# Patient Record
Sex: FEMALE | Race: WHITE | NOT HISPANIC OR LATINO | URBAN - METROPOLITAN AREA
[De-identification: names, ages, dates, MRNs, and addresses within clinical notes are randomized per-mention and may not be internally consistent; named-entity substitution may affect disease eponyms.]

---

## 2021-11-01 ENCOUNTER — OUTPATIENT (OUTPATIENT)
Dept: OUTPATIENT SERVICES | Facility: HOSPITAL | Age: 61
LOS: 1 days | Discharge: HOME | End: 2021-11-01

## 2021-11-01 DIAGNOSIS — Z11.59 ENCOUNTER FOR SCREENING FOR OTHER VIRAL DISEASES: ICD-10-CM

## 2021-11-04 ENCOUNTER — OUTPATIENT (OUTPATIENT)
Dept: OUTPATIENT SERVICES | Facility: HOSPITAL | Age: 61
LOS: 1 days | Discharge: HOME | End: 2021-11-04

## 2021-11-04 VITALS
RESPIRATION RATE: 18 BRPM | HEIGHT: 66 IN | HEART RATE: 106 BPM | WEIGHT: 136.91 LBS | SYSTOLIC BLOOD PRESSURE: 142 MMHG | DIASTOLIC BLOOD PRESSURE: 82 MMHG | TEMPERATURE: 97 F

## 2021-11-04 VITALS
HEART RATE: 90 BPM | RESPIRATION RATE: 17 BRPM | DIASTOLIC BLOOD PRESSURE: 80 MMHG | SYSTOLIC BLOOD PRESSURE: 128 MMHG | OXYGEN SATURATION: 100 %

## 2021-11-04 DIAGNOSIS — Z98.890 OTHER SPECIFIED POSTPROCEDURAL STATES: Chronic | ICD-10-CM

## 2021-11-04 RX ORDER — BUPROPION HYDROCHLORIDE 150 MG/1
1 TABLET, EXTENDED RELEASE ORAL
Qty: 0 | Refills: 0 | DISCHARGE

## 2021-11-04 RX ORDER — DENOSUMAB 60 MG/ML
60 INJECTION SUBCUTANEOUS
Qty: 0 | Refills: 0 | DISCHARGE

## 2021-11-04 RX ORDER — TOPIRAMATE 25 MG
1 TABLET ORAL
Qty: 0 | Refills: 0 | DISCHARGE

## 2021-11-04 NOTE — ASU PATIENT PROFILE, ADULT - NSICDXPASTSURGICALHX_GEN_ALL_CORE_FT
PAST SURGICAL HISTORY:  History of surgical procedure rhinoplasty, eyelift, B/L breast augmentation, lumpectomy of right breast w/ radiation, dental implants

## 2021-11-04 NOTE — ASU PATIENT PROFILE, ADULT - NSICDXPASTMEDICALHX_GEN_ALL_CORE_FT
PAST MEDICAL HISTORY:  History of medical problems breast cancer stage 1 right breast w/ radiation, osteoporosis, migaines, uterine cysts

## 2021-11-14 DIAGNOSIS — H26.8 OTHER SPECIFIED CATARACT: ICD-10-CM

## 2024-02-21 PROBLEM — Z87.898 PERSONAL HISTORY OF OTHER SPECIFIED CONDITIONS: Chronic | Status: ACTIVE | Noted: 2021-11-04

## 2024-03-05 ENCOUNTER — NON-APPOINTMENT (OUTPATIENT)
Age: 64
End: 2024-03-05

## 2024-03-06 DIAGNOSIS — Z86.59 PERSONAL HISTORY OF OTHER MENTAL AND BEHAVIORAL DISORDERS: ICD-10-CM

## 2024-03-06 DIAGNOSIS — Z82.62 FAMILY HISTORY OF OSTEOPOROSIS: ICD-10-CM

## 2024-03-06 DIAGNOSIS — M85.80 OTHER SPECIFIED DISORDERS OF BONE DENSITY AND STRUCTURE, UNSPECIFIED SITE: ICD-10-CM

## 2024-03-06 DIAGNOSIS — Z86.39 PERSONAL HISTORY OF OTHER ENDOCRINE, NUTRITIONAL AND METABOLIC DISEASE: ICD-10-CM

## 2024-03-06 DIAGNOSIS — E78.5 HYPERLIPIDEMIA, UNSPECIFIED: ICD-10-CM

## 2024-03-06 DIAGNOSIS — Z80.1 FAMILY HISTORY OF MALIGNANT NEOPLASM OF TRACHEA, BRONCHUS AND LUNG: ICD-10-CM

## 2024-03-06 DIAGNOSIS — U07.1 COVID-19: ICD-10-CM

## 2024-03-06 DIAGNOSIS — N39.0 URINARY TRACT INFECTION, SITE NOT SPECIFIED: ICD-10-CM

## 2024-03-06 DIAGNOSIS — Z92.3 PERSONAL HISTORY OF IRRADIATION: ICD-10-CM

## 2024-03-06 PROBLEM — Z00.00 ENCOUNTER FOR PREVENTIVE HEALTH EXAMINATION: Status: ACTIVE | Noted: 2024-03-06

## 2024-03-06 RX ORDER — TOPIRAMATE 25 MG/1
25 CAPSULE, COATED PELLETS ORAL
Refills: 0 | Status: ACTIVE | COMMUNITY

## 2024-03-13 ENCOUNTER — OUTPATIENT (OUTPATIENT)
Dept: OUTPATIENT SERVICES | Facility: HOSPITAL | Age: 64
LOS: 1 days | End: 2024-03-13
Payer: COMMERCIAL

## 2024-03-13 ENCOUNTER — LABORATORY RESULT (OUTPATIENT)
Age: 64
End: 2024-03-13

## 2024-03-13 ENCOUNTER — APPOINTMENT (OUTPATIENT)
Dept: HEMATOLOGY ONCOLOGY | Facility: CLINIC | Age: 64
End: 2024-03-13
Payer: COMMERCIAL

## 2024-03-13 VITALS
DIASTOLIC BLOOD PRESSURE: 57 MMHG | HEIGHT: 65 IN | OXYGEN SATURATION: 100 % | HEART RATE: 97 BPM | BODY MASS INDEX: 25.66 KG/M2 | WEIGHT: 154 LBS | TEMPERATURE: 97.4 F | SYSTOLIC BLOOD PRESSURE: 133 MMHG | RESPIRATION RATE: 18 BRPM

## 2024-03-13 VITALS
TEMPERATURE: 97 F | HEART RATE: 97 BPM | SYSTOLIC BLOOD PRESSURE: 133 MMHG | RESPIRATION RATE: 18 BRPM | HEIGHT: 65 IN | OXYGEN SATURATION: 100 % | WEIGHT: 154.1 LBS | DIASTOLIC BLOOD PRESSURE: 57 MMHG

## 2024-03-13 DIAGNOSIS — M81.0 AGE-RELATED OSTEOPOROSIS W/OUT CURRENT PATHOLOGICAL FRACTURE: ICD-10-CM

## 2024-03-13 DIAGNOSIS — Z80.7 FAMILY HISTORY OF OTHER MALIGNANT NEOPLASMS OF LYMPHOID, HEMATOPOIETIC AND RELATED TISSUES: ICD-10-CM

## 2024-03-13 DIAGNOSIS — M81.0 AGE-RELATED OSTEOPOROSIS WITHOUT CURRENT PATHOLOGICAL FRACTURE: ICD-10-CM

## 2024-03-13 DIAGNOSIS — Z85.3 PERSONAL HISTORY OF MALIGNANT NEOPLASM OF BREAST: ICD-10-CM

## 2024-03-13 DIAGNOSIS — Z86.79 PERSONAL HISTORY OF OTHER DISEASES OF THE CIRCULATORY SYSTEM: ICD-10-CM

## 2024-03-13 DIAGNOSIS — Z78.9 OTHER SPECIFIED HEALTH STATUS: ICD-10-CM

## 2024-03-13 DIAGNOSIS — Z98.890 OTHER SPECIFIED POSTPROCEDURAL STATES: Chronic | ICD-10-CM

## 2024-03-13 LAB
ALBUMIN SERPL ELPH-MCNC: 3.8 G/DL
ALP BLD-CCNC: 62 U/L
ALT SERPL-CCNC: 21 U/L
ANION GAP SERPL CALC-SCNC: 11 MMOL/L
AST SERPL-CCNC: 28 U/L
BILIRUB SERPL-MCNC: 0.6 MG/DL
BUN SERPL-MCNC: 15 MG/DL
CALCIUM SERPL-MCNC: 10 MG/DL
CHLORIDE SERPL-SCNC: 109 MMOL/L
CO2 SERPL-SCNC: 28 MMOL/L
CREAT SERPL-MCNC: 0.8 MG/DL
EGFR: 84 ML/MIN/1.73M2
GLUCOSE SERPL-MCNC: 90 MG/DL
POTASSIUM SERPL-SCNC: 4.2 MMOL/L
PROT SERPL-MCNC: 7 G/DL
SODIUM SERPL-SCNC: 148 MMOL/L

## 2024-03-13 PROCEDURE — 99214 OFFICE O/P EST MOD 30 MIN: CPT

## 2024-03-13 PROCEDURE — 36415 COLL VENOUS BLD VENIPUNCTURE: CPT

## 2024-03-13 PROCEDURE — G2211 COMPLEX E/M VISIT ADD ON: CPT | Mod: NC,1L

## 2024-03-13 PROCEDURE — 96372 THER/PROPH/DIAG INJ SC/IM: CPT

## 2024-03-13 RX ORDER — DENOSUMAB 60 MG/ML
60 INJECTION SUBCUTANEOUS
Refills: 0 | Status: ACTIVE | COMMUNITY
Start: 2024-03-13

## 2024-03-13 RX ORDER — RIMEGEPANT SULFATE 75 MG/75MG
75 TABLET, ORALLY DISINTEGRATING ORAL
Refills: 0 | Status: ACTIVE | COMMUNITY
Start: 2024-03-13

## 2024-03-13 RX ORDER — BUPROPION HYDROCHLORIDE 150 MG/1
150 TABLET, FILM COATED, EXTENDED RELEASE ORAL DAILY
Refills: 0 | Status: ACTIVE | COMMUNITY
Start: 2024-03-13

## 2024-03-13 RX ORDER — MULTIVIT-MIN/IRON/FOLIC ACID/K 18-600-40
50 MCG CAPSULE ORAL
Refills: 0 | Status: ACTIVE | COMMUNITY
Start: 2024-03-13

## 2024-03-13 RX ORDER — DENOSUMAB 60 MG/ML
60 INJECTION SUBCUTANEOUS ONCE
Refills: 0 | Status: COMPLETED | OUTPATIENT
Start: 2024-03-13 | End: 2024-03-13

## 2024-03-13 RX ORDER — CETIRIZINE HYDROCHLORIDE 10 MG/1
10 TABLET, FILM COATED ORAL DAILY
Refills: 0 | Status: ACTIVE | COMMUNITY
Start: 2024-03-13

## 2024-03-13 RX ORDER — FAMOTIDINE 20 MG/1
20 TABLET, FILM COATED ORAL DAILY
Refills: 0 | Status: ACTIVE | COMMUNITY
Start: 2024-03-13

## 2024-03-13 RX ADMIN — DENOSUMAB 60 MILLIGRAM(S): 60 INJECTION SUBCUTANEOUS at 12:43

## 2024-03-13 NOTE — REVIEW OF SYSTEMS
[Fatigue] : fatigue [Recent Change In Weight] : ~T recent weight change [Diarrhea: Grade 0] : Diarrhea: Grade 0 [Anxiety] : anxiety [Insomnia] : insomnia [Depression] : depression [Negative] : Allergic/Immunologic [Fever] : no fever [Chills] : no chills [Night Sweats] : no night sweats [Confused] : no confusion [Dizziness] : no dizziness [Fainting] : no fainting [Difficulty Walking] : no difficulty walking [Suicidal] : not suicidal [FreeTextEntry2] : wt gain 20lbs [FreeTextEntry7] : Gerd [de-identified] : migraine

## 2024-03-13 NOTE — HISTORY OF PRESENT ILLNESS
[de-identified] :  recently saw cardiol - w/u in progress. Uncomfortable with wt gain Due for prolia up to date dentist. S/P BCC - upper abd Colonoscopy: last 2019 GYN: due in May but GYN is closing practice Last GYN appt -October 2023 [de-identified] : Had undergone mammography annually since age 37.   7/23/08, bilateral mammography reportedly done.  Additional magnification views of the right breast for apparent nodular density seen and bilateral calcifications.  Additional views done felt to be benign changes, and short interval follow up was recommended.The patient was seen by Dr. Salvador for cosmetic surgery and further evaluation of films was recommended. Left diagnostic mammography 5 mm cluster of microcalcifications, mildly pleomorphic, and were not visualized on mammogram dated 4/7/07.  Stereotactic-guided biopsy was recommended and bilateral breast sonography on 7/31/08 revealed 5 mm hypoechoic nodule in the peripheral 5 o'clock axis of the right breast, and ultrasound-guided core biopsy was recommended.  8/1/08, right  5 o'clock core biopsy revealed infiltrating well differentiated duct carcinoma with predominantly tubular features, and mammotome core biopsies of left breast microcalcifications revealed fibrocystic changes. 8/5/08, bilateral breast MRI revealed a 1.2 cm irregular enhancing mass in the 5 o'clock axis right breast with postbiopsy changes, corresponding to the recently diagnosed invasive cancer, and postbiopsy changes in the left breast 3 to 4 o'clock region, as well as benign, tiny foci of parenchymal enhancement bilaterally, most likely benign and related to fibrocystic changes and/or hormonal factors.  The patient is on Day 14 of her menstrual cycle. 8/8/08, right wide excision and sentinel lymph node excision revealed infiltrating, well differentiated duct carcinoma with tubular features, 0.4 cm, focal DCIS, EORTC low grade, solid type, biopsy site changes, no LVI,  additional margins were negative, 1 sentinel lymph node was negative.  ER-positive 60%, [place]-positive 100%, and HER2/loli negative.Ambry panel neg. Could not tolerate anti-ePatient follow-up with enhanced surveillance mammogram sonogram alternating with breast MRI at 6 months intervalBilateral breast MRI 3/13/2023 [facility] stable 1.2 cm circumscribed high signal nodule in right lower liver new similar-appearing nodule in the lateral aspect of the liver, abdominal ultrasound probable hepatic cysts.7/6/2023 bilateral mammogram and ultrasound unremarkablePatient continues on Prolia is due for treatment today.  Patient has been followed by Dr. Escalante at [facility] 6/20/2023 bone density osteoporosis with improvement.  Patient returns today for follow-up she is concerned about weight gain Wellbutrin has been decreased in dose.  Patient is unable does not exercise regularly she notes discomfort due to weight gain and disc and poor tolerance.  The other daughter is a mahi in high school.She presents today for follow-up

## 2024-03-13 NOTE — ASSESSMENT
[FreeTextEntry1] :  T1a 63-year-old female with positive family history of breast cancer s/p Ambry panel and Ambry panel negative s/p right WLE and RT for at age 50 for T1 a N0 could not tolerate anti-E additional problems include osteoporosis currently receiving Prolia will receive dose today she is up to date with dentist also followed by Dr. Escalante- recent bone density with improvement.  Discussed psychosocial issues regarding family life and children.  Patient will have to be scheduled for breast MRI this month and bilateral mammogram and sonogram in July.  Weight gain was discussed in detail patient does not want referral for weight prop for weight gain reviewed diet and exercise patient has is aerobically deconditioned recommend increasing slowly increasing exercise as tolerated.  Of note patient has body dysmorphic syndrome and history of anorexia.  Patient will receive Prolia today CBC chem and D were checked.  She will follow-up in 6 months.

## 2024-07-29 ENCOUNTER — NON-APPOINTMENT (OUTPATIENT)
Age: 64
End: 2024-07-29

## 2024-08-12 ENCOUNTER — NON-APPOINTMENT (OUTPATIENT)
Age: 64
End: 2024-08-12

## 2024-08-12 ENCOUNTER — APPOINTMENT (OUTPATIENT)
Dept: VASCULAR SURGERY | Facility: CLINIC | Age: 64
End: 2024-08-12
Payer: COMMERCIAL

## 2024-08-12 VITALS
BODY MASS INDEX: 24.99 KG/M2 | DIASTOLIC BLOOD PRESSURE: 81 MMHG | SYSTOLIC BLOOD PRESSURE: 139 MMHG | HEIGHT: 65 IN | WEIGHT: 150 LBS

## 2024-08-12 DIAGNOSIS — M79.89 OTHER SPECIFIED SOFT TISSUE DISORDERS: ICD-10-CM

## 2024-08-12 PROCEDURE — 99203 OFFICE O/P NEW LOW 30 MIN: CPT

## 2024-08-12 PROCEDURE — 93971 EXTREMITY STUDY: CPT

## 2024-08-12 RX ORDER — BUPROPION HYDROCHLORIDE 300 MG/1
300 TABLET, EXTENDED RELEASE ORAL
Refills: 0 | Status: ACTIVE | COMMUNITY

## 2024-08-12 NOTE — DATA REVIEWED
[FreeTextEntry1] : I performed a venous duplex which was medically necessary to evaluate for DVT. It showed no evidence of DVT in the left LE.

## 2024-08-12 NOTE — PHYSICAL EXAM
[2+] : left 2+ [Ankle Swelling (On Exam)] : present [Ankle Swelling On The Left] : moderate [Varicose Veins Of Lower Extremities] : not present [] : not present

## 2024-08-12 NOTE — HISTORY OF PRESENT ILLNESS
[FreeTextEntry1] : 62 y/o F with h/o LLE swelling, presents for worsening elft ankle and foot swelling for the past one week, after she did "a lot" of walking in a new pair of shoes.

## 2024-08-12 NOTE — ASSESSMENT
[FreeTextEntry1] : 62 y/o F with left foot and ankle swelling and pain.  No evidence of DVT in the left LE on duplex today, no cellulitis on exam.  Advised to see Podiatry for foot pain, compression stockings prescribed.

## 2024-09-19 ENCOUNTER — OUTPATIENT (OUTPATIENT)
Dept: OUTPATIENT SERVICES | Facility: HOSPITAL | Age: 64
LOS: 1 days | End: 2024-09-19
Payer: COMMERCIAL

## 2024-09-19 ENCOUNTER — APPOINTMENT (OUTPATIENT)
Dept: INFUSION THERAPY | Facility: CLINIC | Age: 64
End: 2024-09-19

## 2024-09-19 ENCOUNTER — APPOINTMENT (OUTPATIENT)
Dept: HEMATOLOGY ONCOLOGY | Facility: CLINIC | Age: 64
End: 2024-09-19
Payer: COMMERCIAL

## 2024-09-19 ENCOUNTER — RX RENEWAL (OUTPATIENT)
Age: 64
End: 2024-09-19

## 2024-09-19 VITALS
RESPIRATION RATE: 18 BRPM | HEIGHT: 65 IN | HEART RATE: 98 BPM | DIASTOLIC BLOOD PRESSURE: 88 MMHG | OXYGEN SATURATION: 99 % | SYSTOLIC BLOOD PRESSURE: 138 MMHG | WEIGHT: 149.91 LBS | TEMPERATURE: 97 F

## 2024-09-19 VITALS
BODY MASS INDEX: 25.08 KG/M2 | TEMPERATURE: 98 F | DIASTOLIC BLOOD PRESSURE: 77 MMHG | SYSTOLIC BLOOD PRESSURE: 123 MMHG | WEIGHT: 150.5 LBS | OXYGEN SATURATION: 98 % | HEART RATE: 100 BPM | RESPIRATION RATE: 18 BRPM | HEIGHT: 65 IN

## 2024-09-19 DIAGNOSIS — Z85.3 PERSONAL HISTORY OF MALIGNANT NEOPLASM OF BREAST: ICD-10-CM

## 2024-09-19 DIAGNOSIS — M81.0 AGE-RELATED OSTEOPOROSIS WITHOUT CURRENT PATHOLOGICAL FRACTURE: ICD-10-CM

## 2024-09-19 DIAGNOSIS — Z86.59 PERSONAL HISTORY OF OTHER MENTAL AND BEHAVIORAL DISORDERS: ICD-10-CM

## 2024-09-19 DIAGNOSIS — N95.1 MENOPAUSAL AND FEMALE CLIMACTERIC STATES: ICD-10-CM

## 2024-09-19 DIAGNOSIS — Z86.79 PERSONAL HISTORY OF OTHER DISEASES OF THE CIRCULATORY SYSTEM: ICD-10-CM

## 2024-09-19 DIAGNOSIS — Z98.890 OTHER SPECIFIED POSTPROCEDURAL STATES: Chronic | ICD-10-CM

## 2024-09-19 DIAGNOSIS — Z86.39 PERSONAL HISTORY OF OTHER ENDOCRINE, NUTRITIONAL AND METABOLIC DISEASE: ICD-10-CM

## 2024-09-19 DIAGNOSIS — M81.0 AGE-RELATED OSTEOPOROSIS W/OUT CURRENT PATHOLOGICAL FRACTURE: ICD-10-CM

## 2024-09-19 LAB
ALBUMIN SERPL ELPH-MCNC: 3.9 G/DL
ALP BLD-CCNC: 61 U/L
ALT SERPL-CCNC: 18 U/L
ANION GAP SERPL CALC-SCNC: 1 MMOL/L
AST SERPL-CCNC: 26 U/L
BILIRUB SERPL-MCNC: 0.6 MG/DL
BUN SERPL-MCNC: 16 MG/DL
CALCIUM SERPL-MCNC: 9.7 MG/DL
CHLORIDE SERPL-SCNC: 111 MMOL/L
CO2 SERPL-SCNC: 28 MMOL/L
CREAT SERPL-MCNC: 0.8 MG/DL
EGFR: 84 ML/MIN/1.73M2
GLUCOSE SERPL-MCNC: 92 MG/DL
HCT VFR BLD CALC: 35.6 %
HGB BLD-MCNC: 12 G/DL
LYMPHOCYTES # BLD AUTO: 1 K/UL
LYMPHOCYTES NFR BLD AUTO: 25.5 %
MAN DIFF?: NO
MCHC RBC-ENTMCNC: 31.3 PG
MCHC RBC-ENTMCNC: 33.7 GM/DL
MCV RBC AUTO: 92.7 FL
NEUTROPHILS # BLD AUTO: 2.6 K/UL
NEUTROPHILS NFR BLD AUTO: 63.4 %
PLATELET # BLD AUTO: 305 K/UL
POTASSIUM SERPL-SCNC: 4.9 MMOL/L
PROT SERPL-MCNC: 6.8 G/DL
RBC # BLD: 3.84 M/UL
RBC # FLD: 13.4 %
SODIUM SERPL-SCNC: 140 MMOL/L
WBC # FLD AUTO: 4 K/UL

## 2024-09-19 PROCEDURE — G2211 COMPLEX E/M VISIT ADD ON: CPT | Mod: NC

## 2024-09-19 PROCEDURE — 96372 THER/PROPH/DIAG INJ SC/IM: CPT

## 2024-09-19 PROCEDURE — 36415 COLL VENOUS BLD VENIPUNCTURE: CPT

## 2024-09-19 PROCEDURE — 99214 OFFICE O/P EST MOD 30 MIN: CPT

## 2024-09-19 RX ORDER — ATORVASTATIN CALCIUM 20 MG/1
20 TABLET, FILM COATED ORAL
Refills: 0 | Status: ACTIVE | COMMUNITY
Start: 2024-09-19

## 2024-09-19 RX ORDER — DENOSUMAB 60 MG/ML
60 INJECTION SUBCUTANEOUS ONCE
Refills: 0 | Status: COMPLETED | OUTPATIENT
Start: 2024-09-19 | End: 2024-09-19

## 2024-09-19 RX ORDER — ESTRADIOL 10 UG/1
10 TABLET, FILM COATED VAGINAL DAILY
Qty: 57 | Refills: 0 | Status: ACTIVE | COMMUNITY
Start: 2024-09-19 | End: 1900-01-01

## 2024-09-19 RX ORDER — LOSARTAN POTASSIUM 25 MG/1
25 TABLET, FILM COATED ORAL
Refills: 0 | Status: ACTIVE | COMMUNITY
Start: 2024-09-19

## 2024-09-19 RX ADMIN — DENOSUMAB 60 MILLIGRAM(S): 60 INJECTION SUBCUTANEOUS at 12:07

## 2024-09-21 LAB — 25(OH)D3 SERPL-MCNC: 38.9 NG/ML

## 2024-09-22 NOTE — REVIEW OF SYSTEMS
[Recent Change In Weight] : ~T recent weight change [Diarrhea: Grade 0] : Diarrhea: Grade 0 [Insomnia] : insomnia [Anxiety] : anxiety [Depression] : depression [FreeTextEntry2] : wt gain 20lbs [Negative] : Psychiatric [Fever] : no fever [Chills] : no chills [Night Sweats] : no night sweats [Fatigue] : no fatigue [Confused] : no confusion [Dizziness] : no dizziness [Fainting] : no fainting [Difficulty Walking] : no difficulty walking [Suicidal] : not suicidal [FreeTextEntry7] : Gerd [de-identified] : migraines intermittently - Botox with relief

## 2024-09-22 NOTE — HISTORY OF PRESENT ILLNESS
[de-identified] : Had undergone mammography annually since age 37.   08, bilateral mammography reportedly done.  Additional magnification views of the right breast for apparent nodular density seen and bilateral calcifications.  Additional views done felt to be benign changes, and short interval follow up was recommended.The patient was seen by Dr. Salvador for cosmetic surgery and further evaluation of films was recommended. Left diagnostic mammography 5 mm cluster of microcalcifications, mildly pleomorphic, and were not visualized on mammogram dated 07.  Stereotactic-guided biopsy was recommended and bilateral breast sonography on 08 revealed 5 mm hypoechoic nodule in the peripheral 5 o'clock axis of the right breast, and ultrasound-guided core biopsy was recommended.  08, right  5 o'clock core biopsy revealed infiltrating well differentiated duct carcinoma with predominantly tubular features, and mammotome core biopsies of left breast microcalcifications revealed fibrocystic changes. 08, bilateral breast MRI revealed a 1.2 cm irregular enhancing mass in the 5 o'clock axis right breast with postbiopsy changes, corresponding to the recently diagnosed invasive cancer, and postbiopsy changes in the left breast 3 to 4 o'clock region, as well as benign, tiny foci of parenchymal enhancement bilaterally, most likely benign and related to fibrocystic changes and/or hormonal factors.  The patient is on Day 14 of her menstrual cycle. 08, right wide excision and sentinel lymph node excision revealed infiltrating, well differentiated duct carcinoma with tubular features, 0.4 cm, focal DCIS, EORTC low grade, solid type, biopsy site changes, no LVI,  additional margins were negative, 1 sentinel lymph node was negative.  ER-positive 60%, [place]-positive 100%, and HER2/loli negative.Ambry panel neg. Could not tolerate anti-ePatient follow-up with enhanced surveillance mammogram sonogram alternating with breast MRI at 6 months intervalBilateral breast MRI 3/13/2023 [facility] stable 1.2 cm circumscribed high signal nodule in right lower liver new similar-appearing nodule in the lateral aspect of the liver, abdominal ultrasound probable hepatic cysts.2023 bilateral mammogram and ultrasound unremarkablePatient continues on Prolia is due for treatment today.  Patient has been followed by Dr. Escalante at [facility] 2023 bone density osteoporosis with improvement.  Patient returns today for follow-up she is concerned about weight gain Wellbutrin has been decreased in dose.  Patient is unable does not exercise regularly she notes discomfort due to weight gain and disc and poor tolerance.  The other daughter is a mahi in high school.She presents today for follow-up  ALLERGIES:  Penicillin, seasonal allergies, and foods.      PAST MEDICAL HISTORY: Menarche age 14, , TOP and one miscarriage.   40 and 45.  No history of breast feeding.  IVF for 4 cycles,  and .  GYN .  History of mitral valve prolapse.  T&A at age 11.  History of IBS.  Elevated cholesterol for 2 years.  Colonoscopy 5 to 6 years ago with benign polyp.  History of migraine with menses.     SOCIAL HISTORY:  She does not smoke or drink.  She is a homemaker.    FAMILY HISTORY:  AMBRY neg - father [place] descent,  of complications of MI and diabetes at age 76.  He had 2 brothers, one of whom  of lung cancer.  Mother is eastern- descent, non-Yarsani,  of lung cancer at age 73.  She had one sister who  of TB and one brother.  Maternal grandmother with ovarian cancer.  Extended family history is not well-known.  The patient has one brother who had stage IV lymphoma at age 55 and she has 2 daughters.      [de-identified] : recently saw cardiol - w/u in progress. Uncomfortable with wt gain Due for prolia up to date dentist. S/P BCC - upper abd Colonoscopy: last 2019 GYN: due in May but GYN is closing practice Last GYN appt -October 2023 9/19 - feeling well overall, recently started medications for high BP and high cholesterol, denies pain. Unable to tolerate B12. Gained wt - up to date dentist, welbutrin increased had bleeding for 2 d post gyn exam, then UTI - saw urologist. Not sexually active. Walking, No ETOH 7/29/24 Bilat MMG/US [facility] Hx BCC Lt iupper abd wall Followed by Dr Escalante re [facility] Colonoscopy 2019

## 2024-09-22 NOTE — ASSESSMENT
[FreeTextEntry1] :  T1a 63-year-old female with positive family history of breast cancer  Ambry panel  negative s/p right WLE and RT for at age 50 for T1 a N0 could not tolerate anti-E additional problems include osteoporosis currently receiving Prolia will receive dose today she is up to date with dentist also followed by Dr. Escalante- recent bone density with improvement.  Discussed psychosocial issues regarding family life and children.  Patient will have to be scheduled for breast MRI this month and bilateral mammogram and sonogram in July.  Weight gain was discussed in detail patient does not want referral for weight management Discussed diet and exercise. Needs to increase aerobic exercise   Of note patient has body dysmorphic syndrome and history of anorexia.  Patient will receive Prolia today CBC chem and D were checked.  She will follow-up in 6 months.

## 2024-09-22 NOTE — PHYSICAL EXAM
[Normal] : affect appropriate [Restricted in physically strenuous activity but ambulatory and able to carry out work of a light or sedentary nature] : Status 1- Restricted in physically strenuous activity but ambulatory and able to carry out work of a light or sedentary nature, e.g., light house work, office work [de-identified] : bilat augmentation s/p Rt wle and RT, s/p exc L upper abd wall

## 2024-09-22 NOTE — REVIEW OF SYSTEMS
[Recent Change In Weight] : ~T recent weight change [Diarrhea: Grade 0] : Diarrhea: Grade 0 [Insomnia] : insomnia [Anxiety] : anxiety [Depression] : depression [FreeTextEntry2] : wt gain 20lbs [Negative] : Psychiatric [Fever] : no fever [Chills] : no chills [Night Sweats] : no night sweats [Fatigue] : no fatigue [Confused] : no confusion [Dizziness] : no dizziness [Fainting] : no fainting [Difficulty Walking] : no difficulty walking [Suicidal] : not suicidal [FreeTextEntry7] : Gerd [de-identified] : migraines intermittently - Botox with relief

## 2024-09-22 NOTE — HISTORY OF PRESENT ILLNESS
[de-identified] : Had undergone mammography annually since age 37.   08, bilateral mammography reportedly done.  Additional magnification views of the right breast for apparent nodular density seen and bilateral calcifications.  Additional views done felt to be benign changes, and short interval follow up was recommended.The patient was seen by Dr. Salvador for cosmetic surgery and further evaluation of films was recommended. Left diagnostic mammography 5 mm cluster of microcalcifications, mildly pleomorphic, and were not visualized on mammogram dated 07.  Stereotactic-guided biopsy was recommended and bilateral breast sonography on 08 revealed 5 mm hypoechoic nodule in the peripheral 5 o'clock axis of the right breast, and ultrasound-guided core biopsy was recommended.  08, right  5 o'clock core biopsy revealed infiltrating well differentiated duct carcinoma with predominantly tubular features, and mammotome core biopsies of left breast microcalcifications revealed fibrocystic changes. 08, bilateral breast MRI revealed a 1.2 cm irregular enhancing mass in the 5 o'clock axis right breast with postbiopsy changes, corresponding to the recently diagnosed invasive cancer, and postbiopsy changes in the left breast 3 to 4 o'clock region, as well as benign, tiny foci of parenchymal enhancement bilaterally, most likely benign and related to fibrocystic changes and/or hormonal factors.  The patient is on Day 14 of her menstrual cycle. 08, right wide excision and sentinel lymph node excision revealed infiltrating, well differentiated duct carcinoma with tubular features, 0.4 cm, focal DCIS, EORTC low grade, solid type, biopsy site changes, no LVI,  additional margins were negative, 1 sentinel lymph node was negative.  ER-positive 60%, [place]-positive 100%, and HER2/loli negative.Ambry panel neg. Could not tolerate anti-ePatient follow-up with enhanced surveillance mammogram sonogram alternating with breast MRI at 6 months intervalBilateral breast MRI 3/13/2023 [facility] stable 1.2 cm circumscribed high signal nodule in right lower liver new similar-appearing nodule in the lateral aspect of the liver, abdominal ultrasound probable hepatic cysts.2023 bilateral mammogram and ultrasound unremarkablePatient continues on Prolia is due for treatment today.  Patient has been followed by Dr. Escalante at [facility] 2023 bone density osteoporosis with improvement.  Patient returns today for follow-up she is concerned about weight gain Wellbutrin has been decreased in dose.  Patient is unable does not exercise regularly she notes discomfort due to weight gain and disc and poor tolerance.  The other daughter is a mahi in high school.She presents today for follow-up  ALLERGIES:  Penicillin, seasonal allergies, and foods.      PAST MEDICAL HISTORY: Menarche age 14, , TOP and one miscarriage.   40 and 45.  No history of breast feeding.  IVF for 4 cycles,  and .  GYN .  History of mitral valve prolapse.  T&A at age 11.  History of IBS.  Elevated cholesterol for 2 years.  Colonoscopy 5 to 6 years ago with benign polyp.  History of migraine with menses.     SOCIAL HISTORY:  She does not smoke or drink.  She is a homemaker.    FAMILY HISTORY:  AMBRY neg - father [place] descent,  of complications of MI and diabetes at age 76.  He had 2 brothers, one of whom  of lung cancer.  Mother is eastern- descent, non-Spiritism,  of lung cancer at age 73.  She had one sister who  of TB and one brother.  Maternal grandmother with ovarian cancer.  Extended family history is not well-known.  The patient has one brother who had stage IV lymphoma at age 55 and she has 2 daughters.      [de-identified] : recently saw cardiol - w/u in progress. Uncomfortable with wt gain Due for prolia up to date dentist. S/P BCC - upper abd Colonoscopy: last 2019 GYN: due in May but GYN is closing practice Last GYN appt -October 2023 9/19 - feeling well overall, recently started medications for high BP and high cholesterol, denies pain. Unable to tolerate B12. Gained wt - up to date dentist, welbutrin increased had bleeding for 2 d post gyn exam, then UTI - saw urologist. Not sexually active. Walking, No ETOH 7/29/24 Bilat MMG/US [facility] Hx BCC Lt iupper abd wall Followed by Dr Escalante re [facility] Colonoscopy 2019

## 2024-09-22 NOTE — PHYSICAL EXAM
[Normal] : affect appropriate [Restricted in physically strenuous activity but ambulatory and able to carry out work of a light or sedentary nature] : Status 1- Restricted in physically strenuous activity but ambulatory and able to carry out work of a light or sedentary nature, e.g., light house work, office work [de-identified] : bilat augmentation s/p Rt wle and RT, s/p exc L upper abd wall

## 2024-09-22 NOTE — HISTORY OF PRESENT ILLNESS
[de-identified] : Had undergone mammography annually since age 37.   08, bilateral mammography reportedly done.  Additional magnification views of the right breast for apparent nodular density seen and bilateral calcifications.  Additional views done felt to be benign changes, and short interval follow up was recommended.The patient was seen by Dr. Salvador for cosmetic surgery and further evaluation of films was recommended. Left diagnostic mammography 5 mm cluster of microcalcifications, mildly pleomorphic, and were not visualized on mammogram dated 07.  Stereotactic-guided biopsy was recommended and bilateral breast sonography on 08 revealed 5 mm hypoechoic nodule in the peripheral 5 o'clock axis of the right breast, and ultrasound-guided core biopsy was recommended.  08, right  5 o'clock core biopsy revealed infiltrating well differentiated duct carcinoma with predominantly tubular features, and mammotome core biopsies of left breast microcalcifications revealed fibrocystic changes. 08, bilateral breast MRI revealed a 1.2 cm irregular enhancing mass in the 5 o'clock axis right breast with postbiopsy changes, corresponding to the recently diagnosed invasive cancer, and postbiopsy changes in the left breast 3 to 4 o'clock region, as well as benign, tiny foci of parenchymal enhancement bilaterally, most likely benign and related to fibrocystic changes and/or hormonal factors.  The patient is on Day 14 of her menstrual cycle. 08, right wide excision and sentinel lymph node excision revealed infiltrating, well differentiated duct carcinoma with tubular features, 0.4 cm, focal DCIS, EORTC low grade, solid type, biopsy site changes, no LVI,  additional margins were negative, 1 sentinel lymph node was negative.  ER-positive 60%, [place]-positive 100%, and HER2/loli negative.Ambry panel neg. Could not tolerate anti-ePatient follow-up with enhanced surveillance mammogram sonogram alternating with breast MRI at 6 months intervalBilateral breast MRI 3/13/2023 [facility] stable 1.2 cm circumscribed high signal nodule in right lower liver new similar-appearing nodule in the lateral aspect of the liver, abdominal ultrasound probable hepatic cysts.2023 bilateral mammogram and ultrasound unremarkablePatient continues on Prolia is due for treatment today.  Patient has been followed by Dr. Escalante at [facility] 2023 bone density osteoporosis with improvement.  Patient returns today for follow-up she is concerned about weight gain Wellbutrin has been decreased in dose.  Patient is unable does not exercise regularly she notes discomfort due to weight gain and disc and poor tolerance.  The other daughter is a mahi in high school.She presents today for follow-up  ALLERGIES:  Penicillin, seasonal allergies, and foods.      PAST MEDICAL HISTORY: Menarche age 14, , TOP and one miscarriage.   40 and 45.  No history of breast feeding.  IVF for 4 cycles,  and .  GYN .  History of mitral valve prolapse.  T&A at age 11.  History of IBS.  Elevated cholesterol for 2 years.  Colonoscopy 5 to 6 years ago with benign polyp.  History of migraine with menses.     SOCIAL HISTORY:  She does not smoke or drink.  She is a homemaker.    FAMILY HISTORY:  AMBRY neg - father [place] descent,  of complications of MI and diabetes at age 76.  He had 2 brothers, one of whom  of lung cancer.  Mother is eastern- descent, non-Mormon,  of lung cancer at age 73.  She had one sister who  of TB and one brother.  Maternal grandmother with ovarian cancer.  Extended family history is not well-known.  The patient has one brother who had stage IV lymphoma at age 55 and she has 2 daughters.      [de-identified] : recently saw cardiol - w/u in progress. Uncomfortable with wt gain Due for prolia up to date dentist. S/P BCC - upper abd Colonoscopy: last 2019 GYN: due in May but GYN is closing practice Last GYN appt -October 2023 9/19 - feeling well overall, recently started medications for high BP and high cholesterol, denies pain. Unable to tolerate B12. Gained wt - up to date dentist, welbutrin increased had bleeding for 2 d post gyn exam, then UTI - saw urologist. Not sexually active. Walking, No ETOH 7/29/24 Bilat MMG/US [facility] Hx BCC Lt iupper abd wall Followed by Dr Escalante re [facility] Colonoscopy 2019

## 2024-09-22 NOTE — REVIEW OF SYSTEMS
[Recent Change In Weight] : ~T recent weight change [Diarrhea: Grade 0] : Diarrhea: Grade 0 [Insomnia] : insomnia [Anxiety] : anxiety [Depression] : depression [FreeTextEntry2] : wt gain 20lbs [Negative] : Psychiatric [Fever] : no fever [Chills] : no chills [Night Sweats] : no night sweats [Fatigue] : no fatigue [Confused] : no confusion [Dizziness] : no dizziness [Fainting] : no fainting [Difficulty Walking] : no difficulty walking [Suicidal] : not suicidal [FreeTextEntry7] : Gerd [de-identified] : migraines intermittently - Botox with relief

## 2024-09-22 NOTE — PHYSICAL EXAM
[Normal] : affect appropriate [Restricted in physically strenuous activity but ambulatory and able to carry out work of a light or sedentary nature] : Status 1- Restricted in physically strenuous activity but ambulatory and able to carry out work of a light or sedentary nature, e.g., light house work, office work [de-identified] : bilat augmentation s/p Rt wle and RT, s/p exc L upper abd wall

## 2025-03-11 ENCOUNTER — NON-APPOINTMENT (OUTPATIENT)
Age: 65
End: 2025-03-11

## 2025-03-11 ENCOUNTER — APPOINTMENT (OUTPATIENT)
Dept: INFUSION THERAPY | Facility: CLINIC | Age: 65
End: 2025-03-11

## 2025-03-11 ENCOUNTER — APPOINTMENT (OUTPATIENT)
Dept: HEMATOLOGY ONCOLOGY | Facility: CLINIC | Age: 65
End: 2025-03-11
Payer: COMMERCIAL

## 2025-03-11 ENCOUNTER — OUTPATIENT (OUTPATIENT)
Dept: OUTPATIENT SERVICES | Facility: HOSPITAL | Age: 65
LOS: 1 days | End: 2025-03-11
Payer: COMMERCIAL

## 2025-03-11 VITALS
OXYGEN SATURATION: 99 % | WEIGHT: 151.9 LBS | RESPIRATION RATE: 18 BRPM | TEMPERATURE: 98 F | DIASTOLIC BLOOD PRESSURE: 79 MMHG | HEIGHT: 65 IN | HEART RATE: 78 BPM | SYSTOLIC BLOOD PRESSURE: 116 MMHG

## 2025-03-11 VITALS
DIASTOLIC BLOOD PRESSURE: 82 MMHG | BODY MASS INDEX: 25.33 KG/M2 | WEIGHT: 152 LBS | SYSTOLIC BLOOD PRESSURE: 124 MMHG | TEMPERATURE: 97.5 F | RESPIRATION RATE: 18 BRPM | HEIGHT: 65 IN | HEART RATE: 100 BPM | OXYGEN SATURATION: 96 %

## 2025-03-11 DIAGNOSIS — Z85.3 PERSONAL HISTORY OF MALIGNANT NEOPLASM OF BREAST: ICD-10-CM

## 2025-03-11 DIAGNOSIS — M81.0 AGE-RELATED OSTEOPOROSIS WITHOUT CURRENT PATHOLOGICAL FRACTURE: ICD-10-CM

## 2025-03-11 DIAGNOSIS — M81.0 AGE-RELATED OSTEOPOROSIS W/OUT CURRENT PATHOLOGICAL FRACTURE: ICD-10-CM

## 2025-03-11 DIAGNOSIS — Z86.79 PERSONAL HISTORY OF OTHER DISEASES OF THE CIRCULATORY SYSTEM: ICD-10-CM

## 2025-03-11 DIAGNOSIS — Z98.890 OTHER SPECIFIED POSTPROCEDURAL STATES: Chronic | ICD-10-CM

## 2025-03-11 DIAGNOSIS — N95.1 MENOPAUSAL AND FEMALE CLIMACTERIC STATES: ICD-10-CM

## 2025-03-11 LAB
ALBUMIN SERPL ELPH-MCNC: 4.1 G/DL
ALP BLD-CCNC: 63 U/L
ALT SERPL-CCNC: 11 U/L
ANION GAP SERPL CALC-SCNC: 0 MMOL/L
AST SERPL-CCNC: 23 U/L
BILIRUB SERPL-MCNC: 0.7 MG/DL
BUN SERPL-MCNC: 15 MG/DL
CALCIUM SERPL-MCNC: 9.8 MG/DL
CHLORIDE SERPL-SCNC: 111 MMOL/L
CO2 SERPL-SCNC: 26 MMOL/L
CREAT SERPL-MCNC: 0.8 MG/DL
EGFRCR SERPLBLD CKD-EPI 2021: 83 ML/MIN/1.73M2
GLUCOSE SERPL-MCNC: 87 MG/DL
HCT VFR BLD CALC: 37.6 %
HGB BLD-MCNC: 13 G/DL
LYMPHOCYTES # BLD AUTO: 1 K/UL
LYMPHOCYTES NFR BLD AUTO: 19 %
MAN DIFF?: NO
MCHC RBC-ENTMCNC: 31.3 PG
MCHC RBC-ENTMCNC: 34.6 G/DL
MCV RBC AUTO: 90.6 FL
NEUTROPHILS # BLD AUTO: 3.6 K/UL
NEUTROPHILS NFR BLD AUTO: 70 %
PLATELET # BLD AUTO: 311 K/UL
POTASSIUM SERPL-SCNC: 4.9 MMOL/L
PROT SERPL-MCNC: 6.9 G/DL
RBC # BLD: 4.15 M/UL
RBC # FLD: 13.4 %
SODIUM SERPL-SCNC: 137 MMOL/L
WBC # FLD AUTO: 5.2 K/UL

## 2025-03-11 PROCEDURE — G2211 COMPLEX E/M VISIT ADD ON: CPT | Mod: NC

## 2025-03-11 PROCEDURE — 96372 THER/PROPH/DIAG INJ SC/IM: CPT

## 2025-03-11 PROCEDURE — 99214 OFFICE O/P EST MOD 30 MIN: CPT

## 2025-03-11 RX ORDER — METOPROLOL SUCCINATE 25 MG/1
25 TABLET, EXTENDED RELEASE ORAL
Refills: 0 | Status: ACTIVE | COMMUNITY
Start: 2025-03-11

## 2025-03-11 RX ORDER — ESTRADIOL 0.1 MG/G
0.1 CREAM VAGINAL
Qty: 1 | Refills: 1 | Status: ACTIVE | COMMUNITY
Start: 2025-03-11 | End: 1900-01-01

## 2025-03-11 RX ORDER — DENOSUMAB 60 MG/ML
60 INJECTION SUBCUTANEOUS ONCE
Refills: 0 | Status: COMPLETED | OUTPATIENT
Start: 2025-03-11 | End: 2025-03-11

## 2025-03-11 RX ADMIN — DENOSUMAB 60 MILLIGRAM(S): 60 INJECTION SUBCUTANEOUS at 11:46

## 2025-05-13 ENCOUNTER — APPOINTMENT (OUTPATIENT)
Dept: OBGYN | Facility: CLINIC | Age: 65
End: 2025-05-13
Payer: COMMERCIAL

## 2025-05-13 VITALS
WEIGHT: 154 LBS | HEART RATE: 84 BPM | HEIGHT: 65 IN | SYSTOLIC BLOOD PRESSURE: 118 MMHG | BODY MASS INDEX: 25.66 KG/M2 | DIASTOLIC BLOOD PRESSURE: 84 MMHG

## 2025-05-13 DIAGNOSIS — Z01.419 ENCOUNTER FOR GYNECOLOGICAL EXAMINATION (GENERAL) (ROUTINE) W/OUT ABNORMAL FINDINGS: ICD-10-CM

## 2025-05-13 DIAGNOSIS — Z85.3 PERSONAL HISTORY OF MALIGNANT NEOPLASM OF BREAST: ICD-10-CM

## 2025-05-13 DIAGNOSIS — N95.1 MENOPAUSAL AND FEMALE CLIMACTERIC STATES: ICD-10-CM

## 2025-05-13 PROCEDURE — 99459 PELVIC EXAMINATION: CPT

## 2025-05-13 PROCEDURE — 99386 PREV VISIT NEW AGE 40-64: CPT

## 2025-05-13 RX ORDER — ESTRADIOL 0.1 MG/G
0.1 CREAM VAGINAL
Qty: 1 | Refills: 3 | Status: ACTIVE | COMMUNITY
Start: 2025-05-13 | End: 1900-01-01

## 2025-05-19 LAB
C TRACH RRNA SPEC QL NAA+PROBE: NOT DETECTED
HPV HIGH+LOW RISK DNA PNL CVX: NOT DETECTED
N GONORRHOEA RRNA SPEC QL NAA+PROBE: NOT DETECTED
SOURCE TP AMPLIFICATION: NORMAL

## 2025-08-01 ENCOUNTER — NON-APPOINTMENT (OUTPATIENT)
Age: 65
End: 2025-08-01

## 2025-08-04 ENCOUNTER — NON-APPOINTMENT (OUTPATIENT)
Age: 65
End: 2025-08-04

## 2025-09-05 DIAGNOSIS — M81.0 AGE-RELATED OSTEOPOROSIS W/OUT CURRENT PATHOLOGICAL FRACTURE: ICD-10-CM

## 2025-09-09 ENCOUNTER — APPOINTMENT (OUTPATIENT)
Dept: INFUSION THERAPY | Facility: CLINIC | Age: 65
End: 2025-09-09

## 2025-09-09 ENCOUNTER — APPOINTMENT (OUTPATIENT)
Dept: HEMATOLOGY ONCOLOGY | Facility: CLINIC | Age: 65
End: 2025-09-09
Payer: COMMERCIAL

## 2025-09-09 VITALS
BODY MASS INDEX: 25.83 KG/M2 | HEART RATE: 92 BPM | HEIGHT: 65 IN | SYSTOLIC BLOOD PRESSURE: 124 MMHG | TEMPERATURE: 98 F | DIASTOLIC BLOOD PRESSURE: 79 MMHG | WEIGHT: 155 LBS | RESPIRATION RATE: 18 BRPM | OXYGEN SATURATION: 95 %

## 2025-09-09 DIAGNOSIS — M85.80 OTHER SPECIFIED DISORDERS OF BONE DENSITY AND STRUCTURE, UNSPECIFIED SITE: ICD-10-CM

## 2025-09-09 DIAGNOSIS — Z86.59 PERSONAL HISTORY OF OTHER MENTAL AND BEHAVIORAL DISORDERS: ICD-10-CM

## 2025-09-09 DIAGNOSIS — Z85.3 PERSONAL HISTORY OF MALIGNANT NEOPLASM OF BREAST: ICD-10-CM

## 2025-09-09 DIAGNOSIS — Z86.79 PERSONAL HISTORY OF OTHER DISEASES OF THE CIRCULATORY SYSTEM: ICD-10-CM

## 2025-09-09 DIAGNOSIS — N95.1 MENOPAUSAL AND FEMALE CLIMACTERIC STATES: ICD-10-CM

## 2025-09-09 LAB
ALBUMIN SERPL ELPH-MCNC: 3.9 G/DL
ALP BLD-CCNC: 58 U/L
ALT SERPL-CCNC: 23 U/L
ANION GAP SERPL CALC-SCNC: 8 MMOL/L
AST SERPL-CCNC: 25 U/L
BILIRUB SERPL-MCNC: 0.6 MG/DL
BUN SERPL-MCNC: 18 MG/DL
CALCIUM SERPL-MCNC: 9.4 MG/DL
CHLORIDE SERPL-SCNC: 108 MMOL/L
CO2 SERPL-SCNC: 22 MMOL/L
CREAT SERPL-MCNC: 1 MG/DL
EGFRCR SERPLBLD CKD-EPI 2021: 64 ML/MIN/1.73M2
GLUCOSE SERPL-MCNC: 85 MG/DL
HCT VFR BLD CALC: 36.9 %
HGB BLD-MCNC: 12.7 G/DL
LYMPHOCYTES # BLD AUTO: 1.3 K/UL
LYMPHOCYTES NFR BLD AUTO: 25.7 %
MAN DIFF?: NO
MCHC RBC-ENTMCNC: 30.7 PG
MCHC RBC-ENTMCNC: 34.4 G/DL
MCV RBC AUTO: 89.1 FL
NEUTROPHILS # BLD AUTO: 3.1 K/UL
NEUTROPHILS NFR BLD AUTO: 64.8 %
PLATELET # BLD AUTO: 297 K/UL
POTASSIUM SERPL-SCNC: 4.3 MMOL/L
PROT SERPL-MCNC: 6.8 G/DL
RBC # BLD: 4.14 M/UL
RBC # FLD: 13.2 %
SODIUM SERPL-SCNC: 138 MMOL/L
WBC # FLD AUTO: 4.9 K/UL

## 2025-09-09 PROCEDURE — 99214 OFFICE O/P EST MOD 30 MIN: CPT | Mod: 25

## 2025-09-09 PROCEDURE — 36415 COLL VENOUS BLD VENIPUNCTURE: CPT

## 2025-09-10 LAB — 25(OH)D3 SERPL-MCNC: 43.5 NG/ML
